# Patient Record
Sex: MALE | Race: WHITE | NOT HISPANIC OR LATINO
[De-identification: names, ages, dates, MRNs, and addresses within clinical notes are randomized per-mention and may not be internally consistent; named-entity substitution may affect disease eponyms.]

---

## 2020-10-13 PROBLEM — Z00.00 ENCOUNTER FOR PREVENTIVE HEALTH EXAMINATION: Status: ACTIVE | Noted: 2020-10-13

## 2020-10-14 ENCOUNTER — APPOINTMENT (OUTPATIENT)
Dept: RHEUMATOLOGY | Facility: CLINIC | Age: 35
End: 2020-10-14

## 2020-10-28 ENCOUNTER — APPOINTMENT (OUTPATIENT)
Dept: RHEUMATOLOGY | Facility: CLINIC | Age: 35
End: 2020-10-28

## 2023-02-28 ENCOUNTER — APPOINTMENT (OUTPATIENT)
Dept: GASTROENTEROLOGY | Facility: CLINIC | Age: 38
End: 2023-02-28
Payer: COMMERCIAL

## 2023-02-28 ENCOUNTER — LABORATORY RESULT (OUTPATIENT)
Age: 38
End: 2023-02-28

## 2023-02-28 ENCOUNTER — NON-APPOINTMENT (OUTPATIENT)
Age: 38
End: 2023-02-28

## 2023-02-28 VITALS
WEIGHT: 196 LBS | HEIGHT: 66 IN | HEART RATE: 76 BPM | BODY MASS INDEX: 31.5 KG/M2 | SYSTOLIC BLOOD PRESSURE: 132 MMHG | DIASTOLIC BLOOD PRESSURE: 75 MMHG

## 2023-02-28 DIAGNOSIS — Z86.59 PERSONAL HISTORY OF OTHER MENTAL AND BEHAVIORAL DISORDERS: ICD-10-CM

## 2023-02-28 DIAGNOSIS — R14.0 ABDOMINAL DISTENSION (GASEOUS): ICD-10-CM

## 2023-02-28 DIAGNOSIS — Z80.8 FAMILY HISTORY OF MALIGNANT NEOPLASM OF OTHER ORGANS OR SYSTEMS: ICD-10-CM

## 2023-02-28 DIAGNOSIS — Z80.1 FAMILY HISTORY OF MALIGNANT NEOPLASM OF TRACHEA, BRONCHUS AND LUNG: ICD-10-CM

## 2023-02-28 DIAGNOSIS — R11.10 VOMITING, UNSPECIFIED: ICD-10-CM

## 2023-02-28 DIAGNOSIS — Z80.0 FAMILY HISTORY OF MALIGNANT NEOPLASM OF DIGESTIVE ORGANS: ICD-10-CM

## 2023-02-28 DIAGNOSIS — Z78.9 OTHER SPECIFIED HEALTH STATUS: ICD-10-CM

## 2023-02-28 DIAGNOSIS — R13.10 DYSPHAGIA, UNSPECIFIED: ICD-10-CM

## 2023-02-28 DIAGNOSIS — E66.9 OBESITY, UNSPECIFIED: ICD-10-CM

## 2023-02-28 DIAGNOSIS — M35.00 SICCA SYNDROME, UNSPECIFIED: ICD-10-CM

## 2023-02-28 DIAGNOSIS — R68.81 EARLY SATIETY: ICD-10-CM

## 2023-02-28 DIAGNOSIS — R09.89 OTHER SPECIFIED SYMPTOMS AND SIGNS INVOLVING THE CIRCULATORY AND RESPIRATORY SYSTEMS: ICD-10-CM

## 2023-02-28 PROCEDURE — 36415 COLL VENOUS BLD VENIPUNCTURE: CPT | Mod: 59

## 2023-02-28 PROCEDURE — 82272 OCCULT BLD FECES 1-3 TESTS: CPT

## 2023-02-28 PROCEDURE — 99204 OFFICE O/P NEW MOD 45 MIN: CPT | Mod: 25

## 2023-03-01 ENCOUNTER — TRANSCRIPTION ENCOUNTER (OUTPATIENT)
Age: 38
End: 2023-03-01

## 2023-03-01 LAB
ALBUMIN SERPL ELPH-MCNC: 4.9 G/DL
ALP BLD-CCNC: 109 U/L
ALT SERPL-CCNC: 45 U/L
ANION GAP SERPL CALC-SCNC: 14 MMOL/L
AST SERPL-CCNC: 29 U/L
BASOPHILS # BLD AUTO: 0.1 K/UL
BASOPHILS NFR BLD AUTO: 1.1 %
BILIRUB DIRECT SERPL-MCNC: 0.1 MG/DL
BILIRUB SERPL-MCNC: 0.4 MG/DL
BUN SERPL-MCNC: 14 MG/DL
CALCIUM SERPL-MCNC: 10 MG/DL
CHLORIDE SERPL-SCNC: 104 MMOL/L
CHOLEST SERPL-MCNC: 224 MG/DL
CO2 SERPL-SCNC: 21 MMOL/L
CREAT SERPL-MCNC: 1.12 MG/DL
CRP SERPL-MCNC: 5 MG/L
EGFR: 88 ML/MIN/1.73M2
ENDOMYSIUM IGA SER QL: NEGATIVE
ENDOMYSIUM IGA TITR SER: NORMAL
EOSINOPHIL # BLD AUTO: 1.17 K/UL
EOSINOPHIL NFR BLD AUTO: 12.4 %
ERYTHROCYTE [SEDIMENTATION RATE] IN BLOOD BY WESTERGREN METHOD: 10 MM/HR
ESTIMATED AVERAGE GLUCOSE: 103 MG/DL
FERRITIN SERPL-MCNC: 225 NG/ML
FOLATE SERPL-MCNC: 11.4 NG/ML
GGT SERPL-CCNC: 78 U/L
GLUCOSE SERPL-MCNC: 107 MG/DL
HBA1C MFR BLD HPLC: 5.2 %
HCT VFR BLD CALC: 49.2 %
HDLC SERPL-MCNC: 57 MG/DL
HGB BLD-MCNC: 16.9 G/DL
IGA SER QL IEP: 344 MG/DL
IMM GRANULOCYTES NFR BLD AUTO: 0.3 %
IRON SATN MFR SERPL: 40 %
IRON SERPL-MCNC: 125 UG/DL
LDLC SERPL CALC-MCNC: 142 MG/DL
LYMPHOCYTES # BLD AUTO: 2.4 K/UL
LYMPHOCYTES NFR BLD AUTO: 25.5 %
MAGNESIUM SERPL-MCNC: 2 MG/DL
MAN DIFF?: NORMAL
MCHC RBC-ENTMCNC: 30 PG
MCHC RBC-ENTMCNC: 34.3 GM/DL
MCV RBC AUTO: 87.4 FL
MONOCYTES # BLD AUTO: 0.78 K/UL
MONOCYTES NFR BLD AUTO: 8.3 %
NEUTROPHILS # BLD AUTO: 4.92 K/UL
NEUTROPHILS NFR BLD AUTO: 52.4 %
NONHDLC SERPL-MCNC: 166 MG/DL
PHOSPHATE SERPL-MCNC: 3.9 MG/DL
PLATELET # BLD AUTO: 255 K/UL
POTASSIUM SERPL-SCNC: 4.8 MMOL/L
PROT SERPL-MCNC: 7.8 G/DL
RBC # BLD: 5.63 M/UL
RBC # FLD: 14 %
SODIUM SERPL-SCNC: 140 MMOL/L
T3 SERPL-MCNC: 107 NG/DL
T3RU NFR SERPL: 1 TBI
T4 FREE SERPL-MCNC: 1 NG/DL
T4 SERPL-MCNC: 5 UG/DL
TIBC SERPL-MCNC: 313 UG/DL
TRIGL SERPL-MCNC: 122 MG/DL
TSH SERPL-ACNC: 4.07 UIU/ML
UIBC SERPL-MCNC: 188 UG/DL
VIT B12 SERPL-MCNC: 574 PG/ML
WBC # FLD AUTO: 9.4 K/UL

## 2023-03-01 NOTE — HISTORY OF PRESENT ILLNESS
[FreeTextEntry1] : For quite some time, Abel has been experiencing frequent reflux, with dysphagia to solids and globus sensation.  He has undergone prior EGDs, results not immediately available.  He previously was taking omeprazole 40 mg daily, but is not taking any reflux medicines at this point.  He notes that solids tend to stick in the upper-mid esophagus, but this has been increasingly frequent lately.  When he drinks water when something is stuck, the water comes back on him.  He also reports early satiety and frequent regurgitation.  He has nearly predictable loose stools at this point, with excessive flatulence.  He has been gaining weight.  He was previously advised of "milk allergy" on bloodwork.  He has never undergone colonoscopy.  Because of long-standing dry mouth, he ultimately underwent lip biopsy, compatible with Sjogren's.  He will be traveling to [place] on 4/1.

## 2023-03-01 NOTE — REVIEW OF SYSTEMS
[Feeling Tired] : feeling tired [Shortness Of Breath] : shortness of breath [Vomiting] : vomiting [Diarrhea] : diarrhea [Heartburn] : heartburn [Anxiety] : anxiety [Negative] : Heme/Lymph [As Noted in HPI] : as noted in HPI [Bloating (gassiness)] : bloating [FreeTextEntry3] : dry eyes [FreeTextEntry4] : foreign body [FreeTextEntry7] : swallowing, throat feels full

## 2023-03-01 NOTE — CONSULT LETTER
[Dear  ___] : Dear  [unfilled], [Courtesy Letter:] : I had the pleasure of seeing your patient, [unfilled], in my office today. [Please see my note below.] : Please see my note below. [Consult Closing:] : Thank you very much for allowing me to participate in the care of this patient.  If you have any questions, please do not hesitate to contact me. [Sincerely,] : Sincerely, [FreeTextEntry3] : Papito Lerma M.D.\par

## 2023-03-01 NOTE — PHYSICAL EXAM
[No Acute Distress] : no acute distress [Well Developed] : well developed [Well Nourished] : well nourished [Obese (BMI >= 30)] : obese (BMI >= 30) [None] : no edema [Bowel Sounds] : normal bowel sounds [Abdomen Tenderness] : non-tender [No Masses] : no abdominal mass palpated [Normal Sphincter Tone] : normal sphincter tone [No External Hemorrhoid] : no external hemorrhoids [Inguinal Lymph Nodes Enlarged Bilaterally] : no inguinal lymphadenopathy [Normal Color / Pigmentation] : normal skin color and pigmentation [Normal] : oriented to person, place, and time [Occult Blood] : negative occult blood [de-identified] : wears mask

## 2023-03-01 NOTE — ASSESSMENT
[FreeTextEntry1] : 1.  Chronic GERD, dysphagia, regurgitation, globus sensation, gassiness--rule out erosive and/or eosinophilic esophagitis, Helicobacter pylori.  May have GI motility disorder, such as associated with Sjogren's.\par 2.  Chronic diarrhea, excessive flatulence--rule out celiac disease, IBD.  At increased risk for bacterial overgrowth.\par 3.  Obesity.\par 4.  Sjogren's (confirmed by lip biopsy).\par 5.  History of hypothyroidism.\par 6.  Status post left shoulder arthroscopy.\par 7.  Allergic to milk.\par \par Plan:\par 1.  Extensive bloodwork drawn by me today.\par 2.  Medical records reviewed.\par 3.  [facility] brochure on GERD given and discussed.\par 4.  Rechallenge with PPI, such as omeprazole 20 mg, twice daily before meals.  Can increase to as much as 40 mg AC twice daily; may need to add famotidine at bedtime, pending response to above.\par 5.  I advised wife to do Internet search regarding "Globus".\par 6.  Follow-up with ENT later this AM, as pre-scheduled.\par 7.  Schedule repeat EGD with small bowel biopsies (on a gluten-containing diet); consider ileocolonoscopy with biopsies, as well. Procedures, rationale, alternatives, material risks, anesthesia plan, and MiraLAX prep instructions were reviewed and brochures given.\par

## 2023-03-01 NOTE — REASON FOR VISIT
[Consultation] : a consultation visit [Spouse] : spouse [FreeTextEntry1] : Acid reflux, diarrhea, difficulty swallowing

## 2023-03-09 ENCOUNTER — NON-APPOINTMENT (OUTPATIENT)
Age: 38
End: 2023-03-09

## 2023-03-10 LAB
GLIADIN IGA SER QL: 6.8 UNITS
GLIADIN IGG SER QL: 6.4 UNITS
GLIADIN PEPTIDE IGA SER-ACNC: NEGATIVE
GLIADIN PEPTIDE IGG SER-ACNC: NEGATIVE
TTG IGA SER IA-ACNC: 1.2 U/ML
TTG IGA SER-ACNC: NEGATIVE
TTG IGG SER IA-ACNC: 8.8 U/ML
TTG IGG SER IA-ACNC: ABNORMAL

## 2023-05-08 ENCOUNTER — LABORATORY RESULT (OUTPATIENT)
Age: 38
End: 2023-05-08

## 2023-05-08 ENCOUNTER — APPOINTMENT (OUTPATIENT)
Dept: GASTROENTEROLOGY | Facility: CLINIC | Age: 38
End: 2023-05-08
Payer: COMMERCIAL

## 2023-05-08 PROCEDURE — 43239 EGD BIOPSY SINGLE/MULTIPLE: CPT | Mod: 59

## 2023-05-08 PROCEDURE — 45380 COLONOSCOPY AND BIOPSY: CPT

## 2023-05-11 ENCOUNTER — NON-APPOINTMENT (OUTPATIENT)
Age: 38
End: 2023-05-11

## 2023-05-16 ENCOUNTER — OUTPATIENT (OUTPATIENT)
Dept: OUTPATIENT SERVICES | Facility: HOSPITAL | Age: 38
LOS: 1 days | End: 2023-05-16
Payer: COMMERCIAL

## 2023-05-16 ENCOUNTER — NON-APPOINTMENT (OUTPATIENT)
Age: 38
End: 2023-05-16

## 2023-05-16 DIAGNOSIS — R13.14 DYSPHAGIA, PHARYNGOESOPHAGEAL PHASE: ICD-10-CM

## 2023-05-16 DIAGNOSIS — R06.83 SNORING: ICD-10-CM

## 2023-05-16 DIAGNOSIS — J30.9 ALLERGIC RHINITIS, UNSPECIFIED: ICD-10-CM

## 2023-05-16 DIAGNOSIS — J34.2 DEVIATED NASAL SEPTUM: ICD-10-CM

## 2023-05-16 DIAGNOSIS — K21.9 GASTRO-ESOPHAGEAL REFLUX DISEASE WITHOUT ESOPHAGITIS: ICD-10-CM

## 2023-05-16 PROCEDURE — 74230 X-RAY XM SWLNG FUNCJ C+: CPT | Mod: 26

## 2023-05-16 PROCEDURE — 74230 X-RAY XM SWLNG FUNCJ C+: CPT

## 2023-05-16 NOTE — SWALLOW VFSS/MBS ASSESSMENT ADULT - ORAL PHASE
within functional limits +piecemeal deglutition/within functional limits +piecemeal deglutition/Within functional limits

## 2023-05-16 NOTE — SWALLOW VFSS/MBS ASSESSMENT ADULT - DIAGNOSTIC IMPRESSIONS
Alcon-pharyngeal swallow evaluated to be within functional limits. Oral phase of swallow marked by adequate mastication & A-P transit of all trials w/ no anterior loss or oral residue noted. Consistent piecemeal deglutition observed across consistencies. Pharyngeal phase marked by timely swallow w/ adequate hyo-laryngeal elevation/excursion w/ no upper/lower airway entry observed across consistencies. Trace amounts of pharyngeal residue noted on posterior pharyngeal wall which was eliminated w/ subsequent swallows. No backflow visualized from below.

## 2023-05-16 NOTE — SWALLOW VFSS/MBS ASSESSMENT ADULT - RECOMMENDED FEEDING/EATING TECHNIQUES
extra sauces/gravies/allow for swallow between intakes/alternate food with liquid/oral hygiene/position upright (90 degrees)/provide rest periods between swallows/small sips/bites

## 2023-05-17 ENCOUNTER — NON-APPOINTMENT (OUTPATIENT)
Age: 38
End: 2023-05-17

## 2023-06-19 ENCOUNTER — APPOINTMENT (OUTPATIENT)
Dept: GASTROENTEROLOGY | Facility: CLINIC | Age: 38
End: 2023-06-19
Payer: COMMERCIAL

## 2023-06-19 VITALS
SYSTOLIC BLOOD PRESSURE: 132 MMHG | HEIGHT: 66 IN | DIASTOLIC BLOOD PRESSURE: 85 MMHG | HEART RATE: 101 BPM | BODY MASS INDEX: 31.5 KG/M2 | WEIGHT: 196 LBS

## 2023-06-19 DIAGNOSIS — K44.9 DIAPHRAGMATIC HERNIA W/OUT OBSTRUCTION OR GANGRENE: ICD-10-CM

## 2023-06-19 DIAGNOSIS — R19.7 DIARRHEA, UNSPECIFIED: ICD-10-CM

## 2023-06-19 DIAGNOSIS — K76.0 FATTY (CHANGE OF) LIVER, NOT ELSEWHERE CLASSIFIED: ICD-10-CM

## 2023-06-19 DIAGNOSIS — K20.0 EOSINOPHILIC ESOPHAGITIS: ICD-10-CM

## 2023-06-19 DIAGNOSIS — K21.9 GASTRO-ESOPHAGEAL REFLUX DISEASE W/OUT ESOPHAGITIS: ICD-10-CM

## 2023-06-19 PROCEDURE — 99214 OFFICE O/P EST MOD 30 MIN: CPT

## 2023-06-19 RX ORDER — OMEPRAZOLE 40 MG/1
40 CAPSULE, DELAYED RELEASE ORAL
Qty: 180 | Refills: 3 | Status: ACTIVE | COMMUNITY
Start: 2023-05-08 | End: 1900-01-01

## 2023-06-19 NOTE — HISTORY OF PRESENT ILLNESS
[FreeTextEntry1] : EGD 5/8/2023 revealed eosinophilic esophagitis, and hiatal hernia with patulous Schatzki ring, biopsies negative for celiac disease or eosinophilic gastroenteritis.  Same-day colonoscopy revealed tortuous colon and hemorrhoids, but biopsies negative for ileocolitis.  Since the endoscopy, he was taking omeprazole 40 mg daily, with some improvement; dysphagia was much improved, without further regurgitation/vomiting.  He had been seen by ENT (Dr. Holguin) who suggested famotidine 40 mg instead of the omeprazole, but he definitely felt better while on the PPI.  He also was seen by allergist, felt to have seasonal allergies, but no food allergies were reportedly identified. An inhaler was advised, with consideration of Dupixent, neither of which he has started.  Diarrhea continues to be problematic, despite taking a fair amount of Imodium.  CT scan March 2023 had revealed fatty liver as well as the esophageal thickening; labs revealed elevated GGT, with other liver chemistries normal.

## 2023-06-19 NOTE — REASON FOR VISIT
[Follow-up] : a follow-up of an existing diagnosis [Spouse] : spouse [FreeTextEntry1] : Post endoscopy and endoscopy follow up

## 2023-06-19 NOTE — REVIEW OF SYSTEMS
[Feeling Tired] : feeling tired [Negative] : Heme/Lymph [As Noted in HPI] : as noted in HPI [Diarrhea] : diarrhea [FreeTextEntry7] : dysphagia

## 2023-06-19 NOTE — ASSESSMENT
[FreeTextEntry1] : 1.  Eosinophilic esophagitis, hiatal hernia/Schatzki ring at repeat EGD May 2023; chronic GERD, dysphagia, regurgitation, globus sensation, incompletely relieved with acid-suppressive therapy.\par 2.  Chronic diarrhea, excessive flatulence; tortuous colon, hemorrhoids without ileocolitis at colonoscopy May 2023--rule out Crohn disease, celiac disease, eosinophilic enteritis, neoplasm.  Possible IBS-D, bacterial overgrowth.\par 3.  Hepatic steatosis also noted on chest CT March 2023; elevated GGT.\par 4.  Obesity.\par 5.  Sjogren's (confirmed by lip biopsy).\par 6.  History of hypothyroidism.\par 7.  Status post left shoulder arthroscopy.\par 8.  Allergic to milk.\par \par Plan:\par 1.  Diagnosis, prognosis, implications, treatment options for eosinophilic esophagitis described at length.\par 2.  Increase omeprazole to 40 mg AC twice daily x 3 months; can also take famotidine at bedtime, pending response to twice daily PPI.  "PPI information sheet" given.\par 3.  CT enterography (abdomen/pelvis with contrast) advised.  He is not a candidate for capsule endoscopy, given the eosinophilic esophagitis.  He mentioned that he may need anxiolytic to undergo the scan (previously given Xanax; this would be prescribed by PMD).\par 4.  Possible FibroScan after CT.\par 5.  From GI perspective, would hold off on budesonide, etc., until he has been on high-dose acid-suppressive therapy for a few months.\par 6.  Return here in late summer.

## 2023-09-18 ENCOUNTER — APPOINTMENT (OUTPATIENT)
Dept: GASTROENTEROLOGY | Facility: CLINIC | Age: 38
End: 2023-09-18

## 2023-09-26 PROBLEM — Z82.49 FAMILY HISTORY OF PREMATURE CAD: Status: ACTIVE | Noted: 2023-09-26

## 2023-09-26 PROBLEM — Z82.49 FAMILY HISTORY OF MYOCARDIAL INFARCTION: Status: ACTIVE | Noted: 2023-09-26

## 2023-09-26 PROBLEM — E03.9 HYPOTHYROIDISM, UNSPECIFIED TYPE: Status: ACTIVE | Noted: 2023-02-28

## 2023-09-26 PROBLEM — Z92.89 H/O ECHOCARDIOGRAM: Status: RESOLVED | Noted: 2023-09-26 | Resolved: 2023-09-26

## 2023-09-26 PROBLEM — Z82.49 FAMILY HISTORY OF ABDOMINAL AORTIC ANEURYSM (AAA): Status: ACTIVE | Noted: 2023-09-26

## 2023-09-26 PROBLEM — R94.31 ABNORMAL ECG: Status: ACTIVE | Noted: 2023-09-26

## 2023-09-26 RX ORDER — CYCLOBENZAPRINE HYDROCHLORIDE 10 MG/1
10 TABLET, FILM COATED ORAL
Refills: 0 | Status: ACTIVE | COMMUNITY

## 2023-09-27 ENCOUNTER — NON-APPOINTMENT (OUTPATIENT)
Age: 38
End: 2023-09-27

## 2023-09-27 DIAGNOSIS — Z92.89 PERSONAL HISTORY OF OTHER MEDICAL TREATMENT: ICD-10-CM

## 2023-09-27 DIAGNOSIS — Z82.49 FAMILY HISTORY OF ISCHEMIC HEART DISEASE AND OTHER DISEASES OF THE CIRCULATORY SYSTEM: ICD-10-CM

## 2023-09-27 DIAGNOSIS — R94.31 ABNORMAL ELECTROCARDIOGRAM [ECG] [EKG]: ICD-10-CM

## 2023-09-27 DIAGNOSIS — E03.9 HYPOTHYROIDISM, UNSPECIFIED: ICD-10-CM

## 2024-07-14 ENCOUNTER — NON-APPOINTMENT (OUTPATIENT)
Age: 39
End: 2024-07-14